# Patient Record
Sex: MALE | Race: WHITE | HISPANIC OR LATINO | URBAN - METROPOLITAN AREA
[De-identification: names, ages, dates, MRNs, and addresses within clinical notes are randomized per-mention and may not be internally consistent; named-entity substitution may affect disease eponyms.]

---

## 2018-01-26 ENCOUNTER — EMERGENCY (EMERGENCY)
Facility: HOSPITAL | Age: 28
LOS: 1 days | Discharge: HOME | End: 2018-01-26
Admitting: EMERGENCY MEDICINE
Payer: COMMERCIAL

## 2018-01-26 VITALS
OXYGEN SATURATION: 99 % | DIASTOLIC BLOOD PRESSURE: 78 MMHG | SYSTOLIC BLOOD PRESSURE: 121 MMHG | HEART RATE: 72 BPM | RESPIRATION RATE: 16 BRPM | TEMPERATURE: 98 F

## 2018-01-26 DIAGNOSIS — Z98.89 OTHER SPECIFIED POSTPROCEDURAL STATES: Chronic | ICD-10-CM

## 2018-01-26 DIAGNOSIS — Z89.519 ACQUIRED ABSENCE OF UNSPECIFIED LEG BELOW KNEE: Chronic | ICD-10-CM

## 2018-01-26 DIAGNOSIS — Z87.81 PERSONAL HISTORY OF (HEALED) TRAUMATIC FRACTURE: Chronic | ICD-10-CM

## 2018-01-26 PROCEDURE — 99284 EMERGENCY DEPT VISIT MOD MDM: CPT
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
## 2018-01-27 PROCEDURE — 72125 CT NECK SPINE W/O DYE: CPT | Mod: 26

## 2018-01-27 RX ORDER — IBUPROFEN 200 MG
1 TABLET ORAL
Qty: 15 | Refills: 0 | OUTPATIENT
Start: 2018-01-27 | End: 2018-01-31

## 2018-01-27 RX ORDER — KETOROLAC TROMETHAMINE 30 MG/ML
30 SYRINGE (ML) INJECTION ONCE
Qty: 0 | Refills: 0 | Status: DISCONTINUED | OUTPATIENT
Start: 2018-01-27 | End: 2018-01-27

## 2018-01-27 RX ORDER — DIAZEPAM 5 MG
1 TABLET ORAL
Qty: 9 | Refills: 0 | OUTPATIENT
Start: 2018-01-27 | End: 2018-01-29

## 2018-01-27 RX ORDER — ACETAMINOPHEN 500 MG
975 TABLET ORAL ONCE
Qty: 0 | Refills: 0 | Status: COMPLETED | OUTPATIENT
Start: 2018-01-27 | End: 2018-01-27

## 2018-01-27 RX ADMIN — Medication 975 MILLIGRAM(S): at 02:00

## 2018-01-27 RX ADMIN — Medication 30 MILLIGRAM(S): at 02:00

## 2018-01-27 NOTE — ED PROVIDER NOTE - CHPI ED SYMPTOMS NEG
no back pain/no headache/no laceration/no crying/no difficulty bearing weight/no bruising/no loss of consciousness/no fussiness/no sleeping issues/no dizziness/no decreased eating/drinking/no disorientation

## 2018-01-27 NOTE — ED PROVIDER NOTE - PROGRESS NOTE DETAILS
PA Baseil: Pt pain well controlled, CT negative for fx. Pt c spine cleared- FROM in neck, +TTP only at right paraspinal. Pt stable for dc home with f/u.
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
## 2018-01-27 NOTE — ED PROVIDER NOTE - PLAN OF CARE
Rest, advance activity as tolerated  Apply heat to affected area 15 min on/15 min off  Take Motrin 600mg every 6-8 hours with food as needed for pain  You may alternate this with Tylenol 650mg every 4-6 hours as needed for pain  Take Valium 5mg every 8 hours as needed - DO NOT DRIVE ON THIS MEDICATION  Follow up with your PMD within 2-3 days  Return to the ER for worsening
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
## 2022-06-28 ENCOUNTER — EMERGENCY (EMERGENCY)
Facility: HOSPITAL | Age: 32
LOS: 1 days | Discharge: HOME | End: 2022-06-28
Attending: EMERGENCY MEDICINE
Payer: COMMERCIAL

## 2022-06-28 VITALS
HEART RATE: 83 BPM | DIASTOLIC BLOOD PRESSURE: 80 MMHG | WEIGHT: 195.11 LBS | HEIGHT: 67 IN | OXYGEN SATURATION: 99 % | SYSTOLIC BLOOD PRESSURE: 137 MMHG | RESPIRATION RATE: 18 BRPM | TEMPERATURE: 99 F

## 2022-06-28 DIAGNOSIS — Z87.81 PERSONAL HISTORY OF (HEALED) TRAUMATIC FRACTURE: Chronic | ICD-10-CM

## 2022-06-28 DIAGNOSIS — Z89.519 ACQUIRED ABSENCE OF UNSPECIFIED LEG BELOW KNEE: Chronic | ICD-10-CM

## 2022-06-28 DIAGNOSIS — Z98.89 OTHER SPECIFIED POSTPROCEDURAL STATES: Chronic | ICD-10-CM

## 2022-06-28 PROCEDURE — 99284 EMERGENCY DEPT VISIT MOD MDM: CPT

## 2022-06-28 PROCEDURE — 99283 EMERGENCY DEPT VISIT LOW MDM: CPT | Mod: 25

## 2022-06-28 PROCEDURE — 73552 X-RAY EXAM OF FEMUR 2/>: CPT | Mod: 26,RT

## 2022-06-28 PROCEDURE — 73552 X-RAY EXAM OF FEMUR 2/>: CPT

## 2022-06-28 RX ORDER — OXYCODONE HYDROCHLORIDE 5 MG/1
5 TABLET ORAL ONCE
Refills: 0 | Status: DISCONTINUED | OUTPATIENT
Start: 2022-06-28 | End: 2022-06-28

## 2022-06-28 RX ORDER — OXYCODONE HYDROCHLORIDE 5 MG/1
1 TABLET ORAL
Qty: 12 | Refills: 0
Start: 2022-06-28 | End: 2022-06-30

## 2022-06-28 RX ADMIN — Medication 300 MILLIGRAM(S): at 22:31

## 2022-06-28 RX ADMIN — OXYCODONE HYDROCHLORIDE 5 MILLIGRAM(S): 5 TABLET ORAL at 22:30

## 2022-06-28 NOTE — ED PROVIDER NOTE - IV ALTEPLASE EXCL REL HIDDEN
show Home Suture Removal Text: Patient was provided a home suture removal kit and will remove their sutures at home.  If they have any questions or difficulties they will call the office.

## 2022-06-28 NOTE — ED ADULT NURSE NOTE - OBJECTIVE STATEMENT
c/o pain and cut on right leg amputation x1 month. Over last 2 days pain has caused difficulty walking. Pt denies HA, dizziness, N/V/D, fevers, chills, CP, SOB. Pt AOx4, speaking coherently, respirations even and unlabored on RA, skin warm and dry. Positive SBIRT for marijuana use

## 2022-06-28 NOTE — ED PROVIDER NOTE - NSFOLLOWUPINSTRUCTIONS_ED_ALL_ED_FT
- Follow up with your doctor within 2-3 days.   - Take Tylenol (Acetaminophen) 650mg or Motrin (Ibuprofen/Advil) 600mg every 6 hours as needed for pain.   - Take Oxycodone 5mg every 4-6 hours as needed for worsened pain. Try to use the Oxycodone as infrequently as possible. Do not drive or drink alcohol while taking Oxycodone. Stay hydrated when taking this medication as it very commonly causes constipation. You are encouraged to use the stool softener Colace (also called docusate sodium, available over the counter) to avoid constipation.   - Please fill the prescription for the antibiotics and take as directed.  Please finish the entire course of medication as prescribed.  If you have any belly pain after the antibiotics, yogurt has been shown to help with this.  Do not use any alcohol or grapefruit juice with any antibiotics.   - Return to the ED for any new or worsening symptoms.     Cellulitis    Cellulitis is a skin infection caused by bacteria. This condition occurs most often in the arms and lower legs but can occur anywhere over the body. Symptoms include redness, swelling, warm skin, tenderness, and chills/fever. If you were prescribed an antibiotic medicine, take it as told by your health care provider. Do not stop taking the antibiotic even if you start to feel better.    SEEK IMMEDIATE MEDICAL CARE IF YOU HAVE ANY OF THE FOLLOWING SYMPTOMS: worsening fever, red streaks coming from affected area, vomiting or diarrhea, or dizziness/lightheadedness.

## 2022-06-28 NOTE — ED PROVIDER NOTE - PHYSICAL EXAMINATION
Gen: Well appearing in NAD  Head: NC/AT  Neck: trachea midline  Resp:  No distress  Ext: R BKA stump with small linear superficial laceration with ttp and mild surrounding erythema. No induration or fluctuance. No drainage. Sensation intact.   Neuro:  A&O appears non focal  Skin:  Warm and dry as visualized  Psych:  Normal affect and mood

## 2022-06-28 NOTE — ED STATDOCS - PROGRESS NOTE DETAILS
Nish ZARATE for ED attending, Dr. Messina. 30 y/o male with PMHx of right BKA presents to ED c/o cuts on amputated leg. Patient reports he noticed a small cut on the stump of his amputation about 1 month ago, now the area is swollen and tender. Denies fever, chills. Will send to main for further evaluation by another provider.

## 2022-06-28 NOTE — ED PROVIDER NOTE - NSICDXPASTSURGICALHX_GEN_ALL_CORE_FT
PAST SURGICAL HISTORY:  H/O clavicle fracture     H/O skin graft     S/P BKA (below knee amputation) right

## 2022-06-28 NOTE — ED PROVIDER NOTE - PATIENT PORTAL LINK FT
You can access the FollowMyHealth Patient Portal offered by [facility] by registering at the following website: [email or web address]. By joining [employer]’s FollowMyHealth portal, you will also be able to view your health information using other applications (apps) compatible with our system.
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
## 2022-06-28 NOTE — ED ADULT TRIAGE NOTE - CHIEF COMPLAINT QUOTE
Ambulatory reporting cut on his leg r/t prosthesis that he noticed a few days ago and believes is getting infected. Denies fevers, chills.

## 2022-06-28 NOTE — ED STATDOCS - NS_EDPROVIDERDISPOUSERTYPE_ED_A_ED
Scribe Attestation (For Scribes USE Only)... Attending Attestation (For Attendings USE Only).../Scribe Attestation (For Scribes USE Only)... No lymphadedenopathy

## 2022-09-22 ENCOUNTER — EMERGENCY (EMERGENCY)
Facility: HOSPITAL | Age: 32
LOS: 1 days | Discharge: HOME | End: 2022-09-22
Attending: STUDENT IN AN ORGANIZED HEALTH CARE EDUCATION/TRAINING PROGRAM
Payer: COMMERCIAL

## 2022-09-22 VITALS
SYSTOLIC BLOOD PRESSURE: 123 MMHG | RESPIRATION RATE: 16 BRPM | WEIGHT: 182.98 LBS | OXYGEN SATURATION: 99 % | HEART RATE: 62 BPM | TEMPERATURE: 98 F | HEIGHT: 67 IN | DIASTOLIC BLOOD PRESSURE: 81 MMHG

## 2022-09-22 DIAGNOSIS — Z87.81 PERSONAL HISTORY OF (HEALED) TRAUMATIC FRACTURE: Chronic | ICD-10-CM

## 2022-09-22 DIAGNOSIS — Z98.89 OTHER SPECIFIED POSTPROCEDURAL STATES: Chronic | ICD-10-CM

## 2022-09-22 DIAGNOSIS — Z89.519 ACQUIRED ABSENCE OF UNSPECIFIED LEG BELOW KNEE: Chronic | ICD-10-CM

## 2022-09-22 PROCEDURE — 99283 EMERGENCY DEPT VISIT LOW MDM: CPT

## 2022-09-22 PROCEDURE — 99284 EMERGENCY DEPT VISIT MOD MDM: CPT

## 2022-09-22 PROCEDURE — 96372 THER/PROPH/DIAG INJ SC/IM: CPT

## 2022-09-22 RX ORDER — CEPHALEXIN 500 MG
1 CAPSULE ORAL
Qty: 40 | Refills: 0
Start: 2022-09-22 | End: 2022-10-01

## 2022-09-22 RX ORDER — CEPHALEXIN 500 MG
500 CAPSULE ORAL ONCE
Refills: 0 | Status: COMPLETED | OUTPATIENT
Start: 2022-09-22 | End: 2022-09-22

## 2022-09-22 RX ORDER — KETOROLAC TROMETHAMINE 30 MG/ML
30 SYRINGE (ML) INJECTION ONCE
Refills: 0 | Status: DISCONTINUED | OUTPATIENT
Start: 2022-09-22 | End: 2022-09-22

## 2022-09-22 RX ADMIN — Medication 500 MILLIGRAM(S): at 21:27

## 2022-09-22 RX ADMIN — Medication 30 MILLIGRAM(S): at 21:23

## 2022-09-22 NOTE — ED PROVIDER NOTE - NS ED ATTENDING STATEMENT MOD
This was a shared visit with the ERLIN. I reviewed and verified the documentation and independently performed the documented:

## 2022-09-22 NOTE — ED PROVIDER NOTE - PATIENT PORTAL LINK FT
You can access the FollowMyHealth Patient Portal offered by [facility] by registering at the following website: [email or web address]. By joining [employer]’s FollowMyHealth portal, you will also be able to view your health information using other applications (apps) compatible with our system.

## 2022-09-22 NOTE — ED ADULT NURSE NOTE - OBJECTIVE STATEMENT
Pt a&ox4, respirations even and unlabored, VSS complaining of 10/10 right leg pain at amputation site. Pt had amputation 12 year ago states his leg got irritated in past few days, has some odor & pus at site.

## 2022-09-22 NOTE — ED PROVIDER NOTE - CLINICAL SUMMARY MEDICAL DECISION MAKING FREE TEXT BOX
31 yr old M presented to ED with pain and redness to the stom of his below the knee amputated right lower extremity. Pt states that he started having pain and redness x 3 weeks ago due to an ill fitted prosthesis. Pt says the prosthesis caused small cut to his amputated stomp. Examination + slight redness to right below the knee stomp. slight tenderness on palpation. No draining or discharge. Pt treated with pain medication and antibiotic. D/C in stable condition.

## 2022-09-22 NOTE — ED PROVIDER NOTE - OBJECTIVE STATEMENT
31 yr old M presented to ED with pain and redness to the stom of his below the knee amputated right lower extremity. Pt states that he started having pain and redness x 3 weeks ago due to an ill fitted prosthesis. Pt says the prosthesis caused small cut to his amputated stomp. Pt admitted to coming to the ED and was treated with antibiotics. Pt admits to feeling better with the medication and it took some time to get a new prosthesis so he sustained some cuts in the same area with similar symptoms. Pt denies any fever, chills , chest pain or SOB. Pt also denies any hx of DM or any other issues at this time.

## 2022-09-22 NOTE — ED ADULT TRIAGE NOTE - CHIEF COMPLAINT QUOTE
Pt with right lower leg amputation comes in c/o pain and oozing to stump. Pt also reports foul smell coming from it.

## 2022-10-04 ENCOUNTER — APPOINTMENT (OUTPATIENT)
Dept: VASCULAR SURGERY | Facility: CLINIC | Age: 32
End: 2022-10-04

## 2022-10-04 VITALS
HEART RATE: 70 BPM | WEIGHT: 184 LBS | DIASTOLIC BLOOD PRESSURE: 77 MMHG | SYSTOLIC BLOOD PRESSURE: 115 MMHG | RESPIRATION RATE: 16 BRPM | OXYGEN SATURATION: 97 % | TEMPERATURE: 97 F

## 2022-10-04 PROCEDURE — 99202 OFFICE O/P NEW SF 15 MIN: CPT

## 2022-10-05 RX ORDER — COLLAGENASE SANTYL 250 [ARB'U]/G
250 OINTMENT TOPICAL DAILY
Qty: 1 | Refills: 3 | Status: ACTIVE | COMMUNITY
Start: 2022-10-05 | End: 1900-01-01

## 2022-10-06 NOTE — ASSESSMENT
[FreeTextEntry1] : 30yo male right BKA with new wound\par -recommend CT with IV contrast right lower extremity to assess for osteomyelitis and determine depth of wound\par -continue abx\par -continue wound care with santyl and daily dressings\par -follow up in 1 week

## 2022-10-06 NOTE — PHYSICAL EXAM
[JVD] : no jugular venous distention  [Normal Breath Sounds] : Normal breath sounds [Normal Rate and Rhythm] : normal rate and rhythm [Normal Heart Sounds] : normal heart sounds [2+] : left 2+ [de-identified] : well appearing [de-identified] : wnl [FreeTextEntry1] : Right: BKA stump with large scab taken down; 1x2cm wound on anterior surface probed no purulence, some fibrin noted
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
## 2022-10-11 ENCOUNTER — APPOINTMENT (OUTPATIENT)
Dept: VASCULAR SURGERY | Facility: CLINIC | Age: 32
End: 2022-10-11
Payer: COMMERCIAL

## 2022-10-11 ENCOUNTER — EMERGENCY (EMERGENCY)
Facility: HOSPITAL | Age: 32
LOS: 1 days | Discharge: HOME | End: 2022-10-11
Attending: STUDENT IN AN ORGANIZED HEALTH CARE EDUCATION/TRAINING PROGRAM
Payer: COMMERCIAL

## 2022-10-11 VITALS
DIASTOLIC BLOOD PRESSURE: 73 MMHG | SYSTOLIC BLOOD PRESSURE: 112 MMHG | TEMPERATURE: 97.3 F | OXYGEN SATURATION: 97 % | RESPIRATION RATE: 16 BRPM | HEIGHT: 68 IN | HEART RATE: 65 BPM

## 2022-10-11 VITALS
DIASTOLIC BLOOD PRESSURE: 84 MMHG | TEMPERATURE: 98 F | SYSTOLIC BLOOD PRESSURE: 134 MMHG | HEART RATE: 70 BPM | OXYGEN SATURATION: 99 % | WEIGHT: 169.98 LBS | RESPIRATION RATE: 16 BRPM | HEIGHT: 67 IN

## 2022-10-11 DIAGNOSIS — S88.111A COMPLETE TRAUMATIC AMPUTATION AT LVL BETWEEN KNEE AND ANKLE, RIGHT LOWER LEG, INITIAL ENCOUNTER: ICD-10-CM

## 2022-10-11 DIAGNOSIS — Z89.519 ACQUIRED ABSENCE OF UNSPECIFIED LEG BELOW KNEE: Chronic | ICD-10-CM

## 2022-10-11 DIAGNOSIS — Z98.89 OTHER SPECIFIED POSTPROCEDURAL STATES: Chronic | ICD-10-CM

## 2022-10-11 DIAGNOSIS — Z87.81 PERSONAL HISTORY OF (HEALED) TRAUMATIC FRACTURE: Chronic | ICD-10-CM

## 2022-10-11 LAB
ALBUMIN SERPL ELPH-MCNC: 4.2 G/DL — SIGNIFICANT CHANGE UP (ref 3.3–5.2)
ALP SERPL-CCNC: 75 U/L — SIGNIFICANT CHANGE UP (ref 40–120)
ALT FLD-CCNC: 28 U/L — SIGNIFICANT CHANGE UP
ANION GAP SERPL CALC-SCNC: 10 MMOL/L — SIGNIFICANT CHANGE UP (ref 5–17)
AST SERPL-CCNC: 19 U/L — SIGNIFICANT CHANGE UP
BASOPHILS # BLD AUTO: 0.03 K/UL — SIGNIFICANT CHANGE UP (ref 0–0.2)
BASOPHILS NFR BLD AUTO: 0.5 % — SIGNIFICANT CHANGE UP (ref 0–2)
BILIRUB SERPL-MCNC: 0.9 MG/DL — SIGNIFICANT CHANGE UP (ref 0.4–2)
BUN SERPL-MCNC: 10.5 MG/DL — SIGNIFICANT CHANGE UP (ref 8–20)
CALCIUM SERPL-MCNC: 9 MG/DL — SIGNIFICANT CHANGE UP (ref 8.4–10.5)
CHLORIDE SERPL-SCNC: 103 MMOL/L — SIGNIFICANT CHANGE UP (ref 98–107)
CO2 SERPL-SCNC: 25 MMOL/L — SIGNIFICANT CHANGE UP (ref 22–29)
CREAT SERPL-MCNC: 0.87 MG/DL — SIGNIFICANT CHANGE UP (ref 0.5–1.3)
EGFR: 118 ML/MIN/1.73M2 — SIGNIFICANT CHANGE UP
EOSINOPHIL # BLD AUTO: 0.07 K/UL — SIGNIFICANT CHANGE UP (ref 0–0.5)
EOSINOPHIL NFR BLD AUTO: 1.2 % — SIGNIFICANT CHANGE UP (ref 0–6)
GLUCOSE SERPL-MCNC: 90 MG/DL — SIGNIFICANT CHANGE UP (ref 70–99)
HCT VFR BLD CALC: 43.4 % — SIGNIFICANT CHANGE UP (ref 39–50)
HGB BLD-MCNC: 14.8 G/DL — SIGNIFICANT CHANGE UP (ref 13–17)
IMM GRANULOCYTES NFR BLD AUTO: 0.2 % — SIGNIFICANT CHANGE UP (ref 0–0.9)
LYMPHOCYTES # BLD AUTO: 2.33 K/UL — SIGNIFICANT CHANGE UP (ref 1–3.3)
LYMPHOCYTES # BLD AUTO: 40.2 % — SIGNIFICANT CHANGE UP (ref 13–44)
MCHC RBC-ENTMCNC: 31.5 PG — SIGNIFICANT CHANGE UP (ref 27–34)
MCHC RBC-ENTMCNC: 34.1 GM/DL — SIGNIFICANT CHANGE UP (ref 32–36)
MCV RBC AUTO: 92.3 FL — SIGNIFICANT CHANGE UP (ref 80–100)
MONOCYTES # BLD AUTO: 0.48 K/UL — SIGNIFICANT CHANGE UP (ref 0–0.9)
MONOCYTES NFR BLD AUTO: 8.3 % — SIGNIFICANT CHANGE UP (ref 2–14)
NEUTROPHILS # BLD AUTO: 2.88 K/UL — SIGNIFICANT CHANGE UP (ref 1.8–7.4)
NEUTROPHILS NFR BLD AUTO: 49.6 % — SIGNIFICANT CHANGE UP (ref 43–77)
PLATELET # BLD AUTO: 284 K/UL — SIGNIFICANT CHANGE UP (ref 150–400)
POTASSIUM SERPL-MCNC: 4.2 MMOL/L — SIGNIFICANT CHANGE UP (ref 3.5–5.3)
POTASSIUM SERPL-SCNC: 4.2 MMOL/L — SIGNIFICANT CHANGE UP (ref 3.5–5.3)
PROT SERPL-MCNC: 7.5 G/DL — SIGNIFICANT CHANGE UP (ref 6.6–8.7)
RBC # BLD: 4.7 M/UL — SIGNIFICANT CHANGE UP (ref 4.2–5.8)
RBC # FLD: 12.9 % — SIGNIFICANT CHANGE UP (ref 10.3–14.5)
SODIUM SERPL-SCNC: 138 MMOL/L — SIGNIFICANT CHANGE UP (ref 135–145)
WBC # BLD: 5.8 K/UL — SIGNIFICANT CHANGE UP (ref 3.8–10.5)
WBC # FLD AUTO: 5.8 K/UL — SIGNIFICANT CHANGE UP (ref 3.8–10.5)

## 2022-10-11 PROCEDURE — 99213 OFFICE O/P EST LOW 20 MIN: CPT

## 2022-10-11 PROCEDURE — 99285 EMERGENCY DEPT VISIT HI MDM: CPT

## 2022-10-11 NOTE — ED PROVIDER NOTE - PROGRESS NOTE DETAILS
Neftali: Spoke with Vascular surgery, low suspicion for osteomyelitis based on CT imaging. Plan for dc w outpatient followup in one week.

## 2022-10-11 NOTE — ED ADULT NURSE NOTE - OBJECTIVE STATEMENT
assumed care from Lizbet RODRIGUEZ. pt is a 32 yo male with hx of right BKA presented earlier today for right lower leg infection. pt had wound care today for wound on R stump and saw vascular surg. he was told to come here for CT to r/o osteomyelitis. Denies pain, fever, chills, N/V. pt states he has not been wearing his prostetic in the past few weeks. pt is awaiting CT scan

## 2022-10-11 NOTE — ED PROVIDER NOTE - OBJECTIVE STATEMENT
32 yo male with hx of right BKA presents to the ED for right lower leg infection. patients see wound care for wound on right stump. Sent in today by vascular surgery service to R/o osteomyelitis. Denies pain, fever, chills, N/V.

## 2022-10-11 NOTE — CONSULT NOTE ADULT - SUBJECTIVE AND OBJECTIVE BOX
Vascular Surgery Consult Progress Note    Subjective: Patient sent to the ED from the vascular office today for a wound to his right BKA stump. Pt reports the wound is from his prosthesis which he has not been wearing. Pt states that wound began a few weeks ago and was initially draining fluid. Currently wound is closed and no drainage noted.     Vascular HPI  31 year old male, PSHx of a traumatic right BKA, sent to the ED by Dr. Segal to r/o OM to his right BKA. Patient has no other signs of infection and denies any fevers/chills.  Pt seen & examined at bedside. Small wound noted to stump, no drainage noted, Pain controlled.     Medications:      Vital Signs Last 24 Hrs  T(C): 36.7 (11 Oct 2022 15:22), Max: 36.7 (11 Oct 2022 15:22)  T(F): 98 (11 Oct 2022 15:22), Max: 98 (11 Oct 2022 15:22)  HR: 70 (11 Oct 2022 15:22) (70 - 70)  BP: 134/84 (11 Oct 2022 15:22) (134/84 - 134/84)  BP(mean): --  RR: 16 (11 Oct 2022 15:22) (16 - 16)  SpO2: 99% (11 Oct 2022 15:22) (99% - 99%)    Parameters below as of 11 Oct 2022 15:22  Patient On (Oxygen Delivery Method): room air        I&O's Summary      Physical Exam:  Patient appears comfortable. No acute distress  HEENT:  Atraumatic. Normocephalic.  Normal oral mucosa, PERRL, Neck is supple  Neurologic: A & O x 3, no focal deficits. EOMI.  Cardiovascular: normal rate and rhythm  Respiratory: respirations are even and non labored   Gastrointestinal:  Soft, Non-tender, non distended  Extremities: No edema, R BKA   Wound: Small wound to R stump, no drainage or streaking       LABS:                        14.8   5.80  )-----------( 284      ( 11 Oct 2022 17:55 )             43.4

## 2022-10-11 NOTE — ED PROVIDER NOTE - CLINICAL SUMMARY MEDICAL DECISION MAKING FREE TEXT BOX
Sent in by vascular surgery service to r/o osteo. Requesting basic labs and CTA RLE. If inconclusive will recommend MRI. No signs of systemic infection at this time. Vitally stable.

## 2022-10-11 NOTE — ED ADULT TRIAGE NOTE - CHIEF COMPLAINT QUOTE
Pt with recent infection to stump of right knee amputation. Was sent in today from wound care to r/o osteomyelitis.

## 2022-10-11 NOTE — ED PROVIDER NOTE - ATTENDING CONTRIBUTION TO CARE
I personally saw the patient with the resident, and completed the key components of the history and physical exam. I then discussed the management plan with the resident.    30 y/o M with PMH traumatic right BKA presents for right stump infection, sent in by Dr. Davies for rule out osteo.    I agree with exam as documented.    labs, CT, vascular reassessment.

## 2022-10-11 NOTE — ED PROVIDER NOTE - NSFOLLOWUPINSTRUCTIONS_ED_ALL_ED_FT
Followup with Dr. Segal in one week. Return to the ED should your symptoms worsen.     Cellulitis    Cellulitis is a skin infection caused by bacteria. This condition occurs most often in the arms and lower legs but can occur anywhere over the body. Symptoms include redness, swelling, warm skin, tenderness, and chills/fever. If you were prescribed an antibiotic medicine, take it as told by your health care provider. Do not stop taking the antibiotic even if you start to feel better.    SEEK IMMEDIATE MEDICAL CARE IF YOU HAVE ANY OF THE FOLLOWING SYMPTOMS: worsening fever, red streaks coming from affected area, vomiting or diarrhea, or dizziness/lightheadedness.

## 2022-10-11 NOTE — ED PROVIDER NOTE - PATIENT PORTAL LINK FT
You can access the FollowMyHealth Patient Portal offered by [facility] by registering at the following website: [email or web address]. By joining [employer]’s FollowMyHealth portal, you will also be able to view your health information using other applications (apps) compatible with our system.

## 2022-10-11 NOTE — CONSULT NOTE ADULT - ASSESSMENT
31 year old male, PSHx of a traumatic right BKA, sent to the ED by Dr. Segal to r/o OM to his right BKA. Patient has no other signs of infection and denies any fevers/chills.  Pt seen & examined at bedside. Small wound noted to stump, no drainage noted, Pain controlled.     Right BKA Wound  -Recommend basic labs  -Follow up CT with contrast of RLE to r/o OM  -Would recommend MRI and admission if CT inconclusive  -Keep wound covered

## 2022-10-11 NOTE — ED PROVIDER NOTE - CARE PROVIDER_API CALL
Edmundo Segal)  Surgery Vascular  [address] [place] [id number]  Phone: [phone]  Fax: [phone]  Follow Up Time: 7-10 Days

## 2022-10-12 VITALS
OXYGEN SATURATION: 100 % | TEMPERATURE: 98 F | DIASTOLIC BLOOD PRESSURE: 64 MMHG | RESPIRATION RATE: 16 BRPM | HEART RATE: 64 BPM | SYSTOLIC BLOOD PRESSURE: 101 MMHG

## 2022-10-12 LAB
CRP SERPL-MCNC: <4 MG/L — SIGNIFICANT CHANGE UP
ERYTHROCYTE [SEDIMENTATION RATE] IN BLOOD: 17 MM/HR — SIGNIFICANT CHANGE UP (ref 0–20)

## 2022-10-12 PROCEDURE — 96374 THER/PROPH/DIAG INJ IV PUSH: CPT

## 2022-10-12 PROCEDURE — 99284 EMERGENCY DEPT VISIT MOD MDM: CPT | Mod: 25

## 2022-10-12 PROCEDURE — 73701 CT LOWER EXTREMITY W/DYE: CPT | Mod: MG

## 2022-10-12 PROCEDURE — 96375 TX/PRO/DX INJ NEW DRUG ADDON: CPT

## 2022-10-12 PROCEDURE — G1004: CPT

## 2022-10-12 PROCEDURE — 86140 C-REACTIVE PROTEIN: CPT

## 2022-10-12 PROCEDURE — 80053 COMPREHEN METABOLIC PANEL: CPT

## 2022-10-12 PROCEDURE — 36415 COLL VENOUS BLD VENIPUNCTURE: CPT

## 2022-10-12 PROCEDURE — 73701 CT LOWER EXTREMITY W/DYE: CPT | Mod: 26,RT,MG

## 2022-10-12 PROCEDURE — 85652 RBC SED RATE AUTOMATED: CPT

## 2022-10-12 PROCEDURE — 85025 COMPLETE CBC W/AUTO DIFF WBC: CPT

## 2022-10-12 RX ORDER — CEFTRIAXONE 500 MG/1
2000 INJECTION, POWDER, FOR SOLUTION INTRAMUSCULAR; INTRAVENOUS ONCE
Refills: 0 | Status: COMPLETED | OUTPATIENT
Start: 2022-10-12 | End: 2022-10-12

## 2022-10-12 RX ORDER — VANCOMYCIN HCL 1 G
1000 VIAL (EA) INTRAVENOUS ONCE
Refills: 0 | Status: COMPLETED | OUTPATIENT
Start: 2022-10-12 | End: 2022-10-12

## 2022-10-12 RX ORDER — CEFTRIAXONE 500 MG/1
2000 INJECTION, POWDER, FOR SOLUTION INTRAMUSCULAR; INTRAVENOUS ONCE
Refills: 0 | Status: DISCONTINUED | OUTPATIENT
Start: 2022-10-12 | End: 2022-10-12

## 2022-10-12 RX ADMIN — Medication 250 MILLIGRAM(S): at 05:25

## 2022-10-12 RX ADMIN — CEFTRIAXONE 2000 MILLIGRAM(S): 500 INJECTION, POWDER, FOR SOLUTION INTRAMUSCULAR; INTRAVENOUS at 04:52

## 2022-10-12 NOTE — PROGRESS NOTE ADULT - ASSESSMENT
31 year old male, PSHx of a traumatic right BKA, sent to the ED by Dr. Segal to r/o OM to his right BKA. Patient has no other signs of infection and denies any fevers/chills.  Pt seen & examined at bedside. Small wound noted to stump, no drainage noted, Pain controlled. CT with low suspicion for osteo. Will d/c patient home today.

## 2022-10-12 NOTE — PROGRESS NOTE ADULT - SUBJECTIVE AND OBJECTIVE BOX
Subjective: Pt feeling well this morning. Has no pain. No fevers/chills. Vitals stable, no acute events.     Vital Signs Last 24 Hrs  T(C): 36.6 (12 Oct 2022 04:00), Max: 36.9 (11 Oct 2022 19:11)  T(F): 97.8 (12 Oct 2022 04:00), Max: 98.4 (11 Oct 2022 19:11)  HR: 78 (12 Oct 2022 04:00) (69 - 79)  BP: 98/69 (12 Oct 2022 04:00) (98/69 - 134/84)  BP(mean): --  RR: 17 (12 Oct 2022 04:00) (15 - 17)  SpO2: 98% (12 Oct 2022 04:00) (96% - 99%)    Parameters below as of 12 Oct 2022 04:00  Patient On (Oxygen Delivery Method): room air        Physical Exam:    Constitutional: NAD  HEENT: PERRL, EOMI  Neck: No JVD, FROM without pain  Respiratory: Respirations non-labored, no accessory muscle use  Gastrointestinal: Soft, non-tender, non-distended  Extremities: No peripheral edema, No cyanosis. RLE BKA site with small scabbed opening. No active drainiage, no erythema, no purulence   Neurological: A&O x 3; without gross deficit  Musculoskeletal: No joint pain, swelling, deformity, or point tenderness; no limitation of movement      LABS:                        14.8   5.80  )-----------( 284      ( 11 Oct 2022 17:55 )             43.4     10-11    138  |  103  |  10.5  ----------------------------<  90  4.2   |  25.0  |  0.87    Ca    9.0      11 Oct 2022 17:55    TPro  7.5  /  Alb  4.2  /  TBili  0.9  /  DBili  x   /  AST  19  /  ALT  28  /  AlkPhos  75  10-11

## 2022-10-17 ENCOUNTER — TRANSCRIPTION ENCOUNTER (OUTPATIENT)
Age: 32
End: 2022-10-17

## 2022-10-20 ENCOUNTER — APPOINTMENT (OUTPATIENT)
Dept: VASCULAR SURGERY | Facility: CLINIC | Age: 32
End: 2022-10-20

## 2022-10-20 VITALS
HEIGHT: 68 IN | SYSTOLIC BLOOD PRESSURE: 110 MMHG | BODY MASS INDEX: 28.04 KG/M2 | OXYGEN SATURATION: 98 % | TEMPERATURE: 97.3 F | HEART RATE: 80 BPM | RESPIRATION RATE: 16 BRPM | WEIGHT: 185 LBS | DIASTOLIC BLOOD PRESSURE: 74 MMHG

## 2022-10-20 PROCEDURE — 99212 OFFICE O/P EST SF 10 MIN: CPT

## 2022-10-27 ENCOUNTER — APPOINTMENT (OUTPATIENT)
Dept: VASCULAR SURGERY | Facility: CLINIC | Age: 32
End: 2022-10-27

## 2022-10-27 VITALS
DIASTOLIC BLOOD PRESSURE: 76 MMHG | TEMPERATURE: 97.3 F | HEART RATE: 82 BPM | OXYGEN SATURATION: 96 % | RESPIRATION RATE: 16 BRPM | SYSTOLIC BLOOD PRESSURE: 127 MMHG

## 2022-10-27 PROCEDURE — 99212 OFFICE O/P EST SF 10 MIN: CPT

## 2022-10-27 NOTE — HISTORY OF PRESENT ILLNESS
[FreeTextEntry1] : 10/4/22:30yo male right BKA with new wound; debrided to deep dermal layer in office, santyl applied\par \par \par 10/11/22: Wound stable slightly improved no infection; patient had CT which did not demonstrate osteomyelitis\par \par 10/20/22: Wound now has very adherent scab, patient continuing with santyl and continuing without placement of prosthetic [de-identified] : Since last visit no new complaints. Is now considering BKA revision with muscle flap; just not at this time. He denies fevers, chills, redness at wound or increased pain. Doing his own wound care.

## 2022-10-27 NOTE — PROCEDURE
[FreeTextEntry1] : 30yo male BKA for 15 years with no muscle flap done after a trauma; patient with chronic wound at stump now healing\par -would now do daily dry dressings without santyl\par -likely will be able to wear prosthetic in a week or so\par -will continue to discuss muscle flap and BKA revision

## 2022-10-27 NOTE — PHYSICAL EXAM
[JVD] : no jugular venous distention  [Normal Breath Sounds] : Normal breath sounds [Normal Heart Sounds] : normal heart sounds [Normal Rate and Rhythm] : normal rate and rhythm [de-identified] : well appearing ambulates with crutches [FreeTextEntry1] : Left BKA: scab removed with healthy layer of epithelialized skin underneath

## 2022-11-02 NOTE — HISTORY OF PRESENT ILLNESS
[FreeTextEntry1] : 10/4/22:32yo male right BKA with new wound; debrided to deep dermal layer in office, santyl applied\par \par \par 10/11/22: Wound stable slightly improved no infection; patient had CT which did not demonstrate osteomyelitis\par \par  [de-identified] : Mr. Rodriguez continues daily wound care at home with santyl and soft dressing. He is not ussing his prosthetic. He is otherwise doing well; he denies increased pain, redness and drainage from wound. He also denies fevers, chill and systemic signs of infection. Has completed abx coure.

## 2022-11-02 NOTE — ASSESSMENT
[FreeTextEntry1] : 30yo male right BKA without muscle flap with wound no osteomyelitis\par -wound continuing to improve would recommend continued daily dressing changes with santyl\par -would hold off for now on refitting a prosthetic \par -follow up in 1 week for wound check

## 2022-11-02 NOTE — PHYSICAL EXAM
[Normal Breath Sounds] : Normal breath sounds [Normal Heart Sounds] : normal heart sounds [Normal Rate and Rhythm] : normal rate and rhythm [de-identified] : well appearing crutches for ambulation [FreeTextEntry1] : Right BKA: 1x2cm wound with scabbing over it unable to peal off at this time; reapplied santyl and soft foam dressing

## 2022-11-03 ENCOUNTER — APPOINTMENT (OUTPATIENT)
Dept: VASCULAR SURGERY | Facility: CLINIC | Age: 32
End: 2022-11-03

## 2022-11-03 VITALS
DIASTOLIC BLOOD PRESSURE: 83 MMHG | OXYGEN SATURATION: 96 % | SYSTOLIC BLOOD PRESSURE: 120 MMHG | RESPIRATION RATE: 14 BRPM | TEMPERATURE: 97.5 F | HEART RATE: 89 BPM

## 2022-11-03 PROCEDURE — 99212 OFFICE O/P EST SF 10 MIN: CPT

## 2022-11-09 NOTE — ASSESSMENT
[FreeTextEntry1] : 31yo male right BKA stump with wound; well healed\par -feel he is ready to be refit for prosthetic will reach out to his prosthetist \par -follow up in 6 months

## 2022-11-09 NOTE — PHYSICAL EXAM
[de-identified] : well appearing ambulates with crutches [FreeTextEntry1] : right BKA stump well healed good granulation

## 2022-11-09 NOTE — HISTORY OF PRESENT ILLNESS
[FreeTextEntry1] : 10/4/22:32yo male right BKA with new wound; debrided to deep dermal layer in office, santyl applied\par \par \par 10/11/22: Wound stable slightly improved no infection; patient had CT which did not demonstrate osteomyelitis\par \par 10/20/22: Wound now has very adherent scab, patient continuing with santyl and continuing without placement of prosthetic \par \par 10/27/22: scab soft and taken off in office with epithelialization beneath [de-identified] : Doing well since last visit, no drainage no redness no increased pain

## 2023-01-19 ENCOUNTER — APPOINTMENT (OUTPATIENT)
Dept: VASCULAR SURGERY | Facility: CLINIC | Age: 33
End: 2023-01-19
Payer: MEDICAID

## 2023-01-19 VITALS
HEART RATE: 70 BPM | HEIGHT: 68 IN | RESPIRATION RATE: 16 BRPM | SYSTOLIC BLOOD PRESSURE: 123 MMHG | DIASTOLIC BLOOD PRESSURE: 75 MMHG | OXYGEN SATURATION: 98 % | BODY MASS INDEX: 30.62 KG/M2 | WEIGHT: 202 LBS | TEMPERATURE: 96.6 F

## 2023-01-19 PROCEDURE — 99212 OFFICE O/P EST SF 10 MIN: CPT

## 2023-02-01 PROBLEM — S88.111A BELOW-KNEE AMPUTATION OF RIGHT LOWER EXTREMITY: Status: ACTIVE | Noted: 2022-10-04

## 2023-02-01 NOTE — PHYSICAL EXAM
[JVD] : no jugular venous distention  [Normal Breath Sounds] : Normal breath sounds [Normal Rate and Rhythm] : normal rate and rhythm [de-identified] : well appearing ambulates with crutches [FreeTextEntry1] : Right bka site: anterior wound with fibrinous exudate mild erythema with blanching unchanged since last visit

## 2023-02-01 NOTE — HISTORY OF PRESENT ILLNESS
[FreeTextEntry1] : 30yo male right BKA with new wound\par -recommend CT with IV contrast right lower extremity to assess for osteomyelitis and determine depth of wound\par -continue abx\par -continue wound care with santyl and daily dressings\par -follow up in 1 wee [de-identified] : Since last visit Mr. Rodriguez is doing well. He is not using his prosthesis. He is doing his own wound care. He has persistent pain at the site of the wound but denies drainage and malodor. He does state that thre is persistent redness as well. He was compliant with antibiotic course. He otherwise feel well

## 2023-02-01 NOTE — ASSESSMENT
[FreeTextEntry1] : 30yo male right BKA with persist wound\par -given proximity of skin to bone and no change in the size or apearance would be concerned for underlying osteomyelitis; recommend patient go to ED for CT and bloodwork\par -will follow up in 1 week\par -continue daily wound care

## 2023-03-30 NOTE — HISTORY OF PRESENT ILLNESS
[FreeTextEntry1] : 10/4/22:32yo male right BKA with new wound; debrided to deep dermal layer in office, santyl applied\par \par \par 10/11/22: Wound stable slightly improved no infection; patient had CT which did not demonstrate osteomyelitis\par \par 10/20/22: Wound now has very adherent scab, patient continuing with santyl and continuing without placement of prosthetic \par \par 10/27/22: scab soft and taken off in office with epithelialization beneath \par \par Interval History: Doing well since last visit, no drainage no redness no increased pain. \par  [de-identified] : Mr. Rodriguez is here for follow up of right BKA wound that has healed. He is doing well and is wearing newly fit prosthetic. He denies any issues with fit and it otherwise well.

## 2023-03-30 NOTE — PHYSICAL EXAM
[JVD] : no jugular venous distention  [Normal Breath Sounds] : Normal breath sounds [Normal Rate and Rhythm] : normal rate and rhythm [de-identified] : well appearing ambulating with prosthetic [FreeTextEntry1] : Well healed right BKA stump no new wounds

## 2023-03-30 NOTE — ASSESSMENT
[FreeTextEntry1] : 33yo male right BKA stump with wound; well healed\par -Continue with newly fit prosthetic\par -may return to work\par -follow up in 1 year

## 2023-09-06 NOTE — ED ADULT TRIAGE NOTE - AS TEMP SITE
Spoke with patient and relayed the scheduling phone number for Dr. Khan, and also the  central scheduling number.     oral

## 2025-01-11 ENCOUNTER — EMERGENCY (EMERGENCY)
Facility: HOSPITAL | Age: 35
LOS: 1 days | Discharge: HOME | End: 2025-01-11
Attending: STUDENT IN AN ORGANIZED HEALTH CARE EDUCATION/TRAINING PROGRAM
Payer: COMMERCIAL

## 2025-01-11 VITALS
WEIGHT: 184.97 LBS | HEIGHT: 68 IN | DIASTOLIC BLOOD PRESSURE: 106 MMHG | TEMPERATURE: 98 F | SYSTOLIC BLOOD PRESSURE: 139 MMHG | OXYGEN SATURATION: 100 % | HEART RATE: 76 BPM | RESPIRATION RATE: 18 BRPM

## 2025-01-11 VITALS
DIASTOLIC BLOOD PRESSURE: 73 MMHG | OXYGEN SATURATION: 97 % | TEMPERATURE: 97 F | HEART RATE: 60 BPM | RESPIRATION RATE: 18 BRPM | SYSTOLIC BLOOD PRESSURE: 116 MMHG

## 2025-01-11 DIAGNOSIS — Z98.89 OTHER SPECIFIED POSTPROCEDURAL STATES: Chronic | ICD-10-CM

## 2025-01-11 DIAGNOSIS — Z87.81 PERSONAL HISTORY OF (HEALED) TRAUMATIC FRACTURE: Chronic | ICD-10-CM

## 2025-01-11 DIAGNOSIS — Z89.519 ACQUIRED ABSENCE OF UNSPECIFIED LEG BELOW KNEE: Chronic | ICD-10-CM

## 2025-01-11 PROCEDURE — 99283 EMERGENCY DEPT VISIT LOW MDM: CPT | Mod: 25

## 2025-01-11 PROCEDURE — 73564 X-RAY EXAM KNEE 4 OR MORE: CPT | Mod: 26,RT

## 2025-01-11 PROCEDURE — 99284 EMERGENCY DEPT VISIT MOD MDM: CPT

## 2025-01-11 PROCEDURE — 96372 THER/PROPH/DIAG INJ SC/IM: CPT

## 2025-01-11 PROCEDURE — 73564 X-RAY EXAM KNEE 4 OR MORE: CPT

## 2025-01-11 PROCEDURE — 90715 TDAP VACCINE 7 YRS/> IM: CPT

## 2025-01-11 PROCEDURE — 90471 IMMUNIZATION ADMIN: CPT

## 2025-01-11 RX ORDER — OXYCODONE HYDROCHLORIDE 30 MG/1
5 TABLET ORAL ONCE
Refills: 0 | Status: DISCONTINUED | OUTPATIENT
Start: 2025-01-11 | End: 2025-01-11

## 2025-01-11 RX ORDER — KETOROLAC TROMETHAMINE 30 MG/ML
15 INJECTION, SOLUTION INTRAMUSCULAR; INTRAVENOUS ONCE
Refills: 0 | Status: DISCONTINUED | OUTPATIENT
Start: 2025-01-11 | End: 2025-01-11

## 2025-01-11 RX ADMIN — OXYCODONE HYDROCHLORIDE 5 MILLIGRAM(S): 30 TABLET ORAL at 19:58

## 2025-01-11 RX ADMIN — KETOROLAC TROMETHAMINE 15 MILLIGRAM(S): 30 INJECTION, SOLUTION INTRAMUSCULAR; INTRAVENOUS at 19:58
